# Patient Record
Sex: FEMALE | Race: BLACK OR AFRICAN AMERICAN | NOT HISPANIC OR LATINO | URBAN - METROPOLITAN AREA
[De-identification: names, ages, dates, MRNs, and addresses within clinical notes are randomized per-mention and may not be internally consistent; named-entity substitution may affect disease eponyms.]

---

## 2018-04-23 ENCOUNTER — APPOINTMENT (RX ONLY)
Dept: URBAN - METROPOLITAN AREA OTHER 4 | Facility: OTHER | Age: 53
Setting detail: DERMATOLOGY
End: 2018-04-23

## 2018-04-23 DIAGNOSIS — L56.8 OTHER SPECIFIED ACUTE SKIN CHANGES DUE TO ULTRAVIOLET RADIATION: ICD-10-CM

## 2018-04-23 DIAGNOSIS — L20.89 OTHER ATOPIC DERMATITIS: ICD-10-CM

## 2018-04-23 PROBLEM — E78.5 HYPERLIPIDEMIA, UNSPECIFIED: Status: ACTIVE | Noted: 2018-04-23

## 2018-04-23 PROBLEM — L20.84 INTRINSIC (ALLERGIC) ECZEMA: Status: ACTIVE | Noted: 2018-04-23

## 2018-04-23 PROCEDURE — ? PRESCRIPTION

## 2018-04-23 PROCEDURE — 99203 OFFICE O/P NEW LOW 30 MIN: CPT

## 2018-04-23 PROCEDURE — ? COUNSELING

## 2018-04-23 PROCEDURE — ? TREATMENT REGIMEN

## 2018-04-23 RX ORDER — HYDROCORTISONE 25 MG/ML
LOTION TOPICAL
Qty: 1 | Refills: 1 | Status: ERX | COMMUNITY
Start: 2018-04-23

## 2018-04-23 RX ORDER — HYDROQUINONE 4 %
CREAM (GRAM) TOPICAL
Qty: 1 | Refills: 1 | Status: ERX | COMMUNITY
Start: 2018-04-23

## 2018-04-23 RX ADMIN — HYDROCORTISONE: 25 LOTION TOPICAL at 00:00

## 2018-04-23 RX ADMIN — Medication: at 00:00

## 2018-04-23 ASSESSMENT — LOCATION DETAILED DESCRIPTION DERM
LOCATION DETAILED: LEFT AXILLARY VAULT
LOCATION DETAILED: RIGHT CENTRAL MALAR CHEEK
LOCATION DETAILED: RIGHT AXILLARY VAULT

## 2018-04-23 ASSESSMENT — LOCATION SIMPLE DESCRIPTION DERM
LOCATION SIMPLE: RIGHT CHEEK
LOCATION SIMPLE: LEFT AXILLARY VAULT
LOCATION SIMPLE: RIGHT AXILLARY VAULT

## 2018-04-23 ASSESSMENT — SEVERITY ASSESSMENT
SEVERITY: MODERATE
SEVERITY: MILD TO MODERATE

## 2018-04-23 ASSESSMENT — PAIN INTENSITY VAS: HOW INTENSE IS YOUR PAIN 0 BEING NO PAIN, 10 BEING THE MOST SEVERE PAIN POSSIBLE?: NO PAIN

## 2018-04-23 ASSESSMENT — LOCATION ZONE DERM
LOCATION ZONE: FACE
LOCATION ZONE: AXILLAE

## 2019-03-28 ENCOUNTER — RX ONLY (OUTPATIENT)
Age: 54
Setting detail: RX ONLY
End: 2019-03-28

## 2019-03-28 RX ORDER — HYDROCORTISONE 25 MG/ML
LOTION TOPICAL
Qty: 1 | Refills: 0 | Status: ERX

## 2019-06-03 ENCOUNTER — APPOINTMENT (RX ONLY)
Dept: URBAN - METROPOLITAN AREA OTHER 4 | Facility: OTHER | Age: 54
Setting detail: DERMATOLOGY
End: 2019-06-03

## 2019-06-03 DIAGNOSIS — L259 CONTACT DERMATITIS AND OTHER ECZEMA, UNSPECIFIED CAUSE: ICD-10-CM

## 2019-06-03 PROBLEM — L20.84 INTRINSIC (ALLERGIC) ECZEMA: Status: ACTIVE | Noted: 2019-06-03

## 2019-06-03 PROBLEM — L23.9 ALLERGIC CONTACT DERMATITIS, UNSPECIFIED CAUSE: Status: ACTIVE | Noted: 2019-06-03

## 2019-06-03 PROCEDURE — ? PRESCRIPTION

## 2019-06-03 PROCEDURE — 99213 OFFICE O/P EST LOW 20 MIN: CPT

## 2019-06-03 PROCEDURE — ? COUNSELING

## 2019-06-03 RX ORDER — TRIAMCINOLONE ACETONIDE 1 MG/G
OINTMENT TOPICAL
Qty: 1 | Refills: 0 | Status: ERX | COMMUNITY
Start: 2019-06-03

## 2019-06-03 RX ORDER — METHYLPREDNISOLONE 4 MG/1
TABLET ORAL
Qty: 1 | Refills: 0 | Status: ERX | COMMUNITY
Start: 2019-06-03

## 2019-06-03 RX ORDER — CLOBETASOL PROPIONATE 0.5 MG/G
OINTMENT TOPICAL
Qty: 1 | Refills: 0 | Status: ERX | COMMUNITY
Start: 2019-06-03

## 2019-06-03 RX ADMIN — CLOBETASOL PROPIONATE: 0.5 OINTMENT TOPICAL at 17:51

## 2019-06-03 RX ADMIN — TRIAMCINOLONE ACETONIDE: 1 OINTMENT TOPICAL at 17:52

## 2019-06-03 RX ADMIN — METHYLPREDNISOLONE: 4 TABLET ORAL at 17:51

## 2019-06-03 ASSESSMENT — LOCATION SIMPLE DESCRIPTION DERM
LOCATION SIMPLE: RIGHT FOREARM
LOCATION SIMPLE: LEFT FOREARM

## 2019-06-03 ASSESSMENT — SEVERITY ASSESSMENT: SEVERITY: MODERATE

## 2019-06-03 ASSESSMENT — LOCATION ZONE DERM: LOCATION ZONE: ARM

## 2019-06-03 ASSESSMENT — LOCATION DETAILED DESCRIPTION DERM
LOCATION DETAILED: RIGHT PROXIMAL DORSAL FOREARM
LOCATION DETAILED: LEFT DISTAL DORSAL FOREARM

## 2019-06-03 ASSESSMENT — PAIN INTENSITY VAS: HOW INTENSE IS YOUR PAIN 0 BEING NO PAIN, 10 BEING THE MOST SEVERE PAIN POSSIBLE?: NO PAIN

## 2019-06-18 ENCOUNTER — APPOINTMENT (RX ONLY)
Dept: URBAN - METROPOLITAN AREA OTHER 4 | Facility: OTHER | Age: 54
Setting detail: DERMATOLOGY
End: 2019-06-18

## 2019-06-18 DIAGNOSIS — L259 CONTACT DERMATITIS AND OTHER ECZEMA, UNSPECIFIED CAUSE: ICD-10-CM | Status: IMPROVED

## 2019-06-18 PROBLEM — L23.9 ALLERGIC CONTACT DERMATITIS, UNSPECIFIED CAUSE: Status: ACTIVE | Noted: 2019-06-18

## 2019-06-18 PROCEDURE — 99213 OFFICE O/P EST LOW 20 MIN: CPT

## 2019-06-18 PROCEDURE — ? TREATMENT REGIMEN

## 2019-06-18 PROCEDURE — ? COUNSELING

## 2019-06-18 ASSESSMENT — LOCATION SIMPLE DESCRIPTION DERM
LOCATION SIMPLE: RIGHT FOREARM
LOCATION SIMPLE: LEFT FOREARM

## 2019-06-18 ASSESSMENT — LOCATION DETAILED DESCRIPTION DERM
LOCATION DETAILED: LEFT PROXIMAL DORSAL FOREARM
LOCATION DETAILED: RIGHT PROXIMAL DORSAL FOREARM

## 2019-06-18 ASSESSMENT — PAIN INTENSITY VAS: HOW INTENSE IS YOUR PAIN 0 BEING NO PAIN, 10 BEING THE MOST SEVERE PAIN POSSIBLE?: NO PAIN

## 2019-06-18 ASSESSMENT — SEVERITY ASSESSMENT: SEVERITY: MILD

## 2019-06-18 ASSESSMENT — LOCATION ZONE DERM: LOCATION ZONE: ARM

## 2019-06-18 NOTE — PROCEDURE: TREATMENT REGIMEN
Continue Regimen: Triamcinolone ointment prn rash
Plan: Discussed using pysical block when out in the sun.
Detail Level: Simple
Samples Given: Gave samples of sunscreen with Zinc oxide to try

## 2019-10-04 ENCOUNTER — RX ONLY (OUTPATIENT)
Age: 54
Setting detail: RX ONLY
End: 2019-10-04

## 2019-10-04 RX ORDER — TRIAMCINOLONE ACETONIDE 1 MG/G
OINTMENT TOPICAL
Qty: 1 | Refills: 0 | Status: ERX

## 2020-05-05 PROBLEM — 14760008 CONSTIPATION: Status: ACTIVE | Noted: 2020-02-04

## 2020-05-05 PROBLEM — 305058001: Status: ACTIVE | Noted: 2020-05-05

## 2020-07-02 ENCOUNTER — TELEPHONE ENCOUNTER (OUTPATIENT)
Dept: URBAN - METROPOLITAN AREA CLINIC 35 | Facility: CLINIC | Age: 55
End: 2020-07-02

## 2020-07-02 RX ORDER — DICLOFENAC SODIUM TOPICAL GEL, 1%, 10 MG/G
APPLY 4 GRAMS TO AFFECTED AREA 4 TIMES A DAY GEL TOPICAL
Qty: 500 UNSPECIFIED | Refills: 4 | COMMUNITY

## 2020-07-02 RX ORDER — ZOLPIDEM TARTRATE 10 MG/1
1 TABLET AT BEDTIME AS NEEDED TABLET, FILM COATED ORAL ONCE A DAY
COMMUNITY

## 2020-07-02 RX ORDER — LORATADINE 10 MG/1
1 TABLET TABLET ORAL ONCE A DAY
Qty: 30 | COMMUNITY

## 2020-07-02 RX ORDER — ATORVASTATIN CALCIUM, FILM COATED 20 MG/1
TAKE 1 TABLET BY MOUTH EVERYDAY AT BEDTIME TABLET ORAL
Qty: 90 UNSPECIFIED | Refills: 1 | COMMUNITY

## 2020-07-02 RX ORDER — DIPHENHYDRAMINE HCL 2 %
1 TABLET AS NEEDED CREAM (GRAM) TOPICAL
COMMUNITY

## 2020-07-02 RX ORDER — URSODIOL 300 MG/1
AS DIRECTED 2 AM AND 1 PM CAPSULE ORAL TWICE A DAY
COMMUNITY

## 2020-07-02 RX ORDER — BECLOMETHASONE DIPROPIONATE HFA 40 UG/1
1 PUFF AEROSOL, METERED RESPIRATORY (INHALATION) ONCE A DAY
COMMUNITY

## 2020-07-02 RX ORDER — ESTRADIOL 10 UG/1
AS DIRECTED INSERT VAGINAL
COMMUNITY
Start: 2020-02-04

## 2020-07-02 RX ORDER — ALBUTEROL SULFATE 90 UG/1
1 PUFF AS NEEDED AEROSOL, METERED RESPIRATORY (INHALATION)
COMMUNITY

## 2020-07-27 ENCOUNTER — LAB OUTSIDE AN ENCOUNTER (OUTPATIENT)
Dept: URBAN - METROPOLITAN AREA CLINIC 35 | Facility: CLINIC | Age: 55
End: 2020-07-27

## 2020-07-30 ENCOUNTER — TELEPHONE ENCOUNTER (OUTPATIENT)
Dept: URBAN - METROPOLITAN AREA CLINIC 35 | Facility: CLINIC | Age: 55
End: 2020-07-30

## 2020-07-30 RX ORDER — URSODIOL 300 MG/1
AS DIRECTED 2 AM AND 1 PM CAPSULE ORAL TWICE A DAY
Qty: 270 | Refills: 2 | OUTPATIENT

## 2020-07-31 LAB
A/G RATIO: 1.3
ALBUMIN, SERUM: 4.5
ALKALINE PHOSPHATASE, S: 245
ALT (SGPT): 29
AST (SGOT): 34
BILIRUBIN, TOTAL: <0.2
BUN/CREATININE RATIO: 17
BUN: 13
CALCIUM, SERUM: 9.8
CARBON DIOXIDE, TOTAL: 24
CHLORIDE, SERUM: 104
CREATININE, SERUM: 0.75
EGFR IF AFRICN AM: 105
EGFR IF NONAFRICN AM: 91
GLOBULIN, TOTAL: 3.6
GLUCOSE, SERUM: (no result)
POTASSIUM, SERUM: (no result)
PROTEIN, TOTAL, SERUM: 8.1
SODIUM, SERUM: 144

## 2020-08-04 ENCOUNTER — OFFICE VISIT (OUTPATIENT)
Dept: URBAN - METROPOLITAN AREA CLINIC 35 | Facility: CLINIC | Age: 55
End: 2020-08-04

## 2020-08-04 VITALS — BODY MASS INDEX: 21.17 KG/M2 | HEIGHT: 64 IN | WEIGHT: 124 LBS

## 2020-08-04 RX ORDER — URSODIOL 300 MG/1
AS DIRECTED 2 AM AND 1 PM CAPSULE ORAL TWICE A DAY
Qty: 270 | Refills: 2 | Status: ACTIVE | COMMUNITY

## 2020-08-04 RX ORDER — LORATADINE 10 MG/1
1 TABLET TABLET ORAL ONCE A DAY
Qty: 30 | COMMUNITY

## 2020-08-04 RX ORDER — DICLOFENAC SODIUM TOPICAL GEL, 1%, 10 MG/G
APPLY 4 GRAMS TO AFFECTED AREA 4 TIMES A DAY GEL TOPICAL
Qty: 500 UNSPECIFIED | Refills: 4 | Status: ACTIVE | COMMUNITY

## 2020-08-04 RX ORDER — ATORVASTATIN CALCIUM, FILM COATED 20 MG/1
TAKE 1 TABLET BY MOUTH EVERYDAY AT BEDTIME TABLET ORAL
Qty: 90 UNSPECIFIED | Refills: 1 | Status: ACTIVE | COMMUNITY

## 2020-08-04 RX ORDER — LORATADINE 10 MG/1
1 TABLET TABLET ORAL ONCE A DAY
Qty: 30 | Status: ON HOLD | COMMUNITY

## 2020-08-04 RX ORDER — BECLOMETHASONE DIPROPIONATE HFA 40 UG/1
1 PUFF AEROSOL, METERED RESPIRATORY (INHALATION) ONCE A DAY
Status: ACTIVE | COMMUNITY

## 2020-08-04 RX ORDER — ZOLPIDEM TARTRATE 10 MG/1
1 TABLET AT BEDTIME AS NEEDED TABLET, FILM COATED ORAL ONCE A DAY
Status: ACTIVE | COMMUNITY

## 2020-08-04 RX ORDER — SALICYLIC ACID 3 G/100G
1 TABLET AS NEEDED LOTION TOPICAL ONCE A DAY
Qty: 30 | Status: ACTIVE | COMMUNITY

## 2020-08-04 RX ORDER — URSODIOL 300 MG/1
AS DIRECTED 2 AM AND 1 PM CAPSULE ORAL TWICE A DAY
OUTPATIENT

## 2020-08-04 RX ORDER — LORATADINE 10 MG
1 PACKET MIXED WITH 8 OUNCES OF FLUID TABLET,DISINTEGRATING ORAL ONCE A DAY
Qty: 30 | OUTPATIENT
Start: 2020-08-04

## 2020-08-04 RX ORDER — DIPHENHYDRAMINE HCL 2 %
1 TABLET AS NEEDED CREAM (GRAM) TOPICAL
Status: ACTIVE | COMMUNITY

## 2020-08-04 RX ORDER — ALBUTEROL SULFATE 90 UG/1
1 PUFF AS NEEDED AEROSOL, METERED RESPIRATORY (INHALATION)
COMMUNITY

## 2020-08-04 NOTE — HPI-MIGRATED HPI
;   ;   ;     Primary Biliary Cirrhosis (PBC) : Patient presents today to review labs and follow up of PBC.  She continue to take Ursodiol 300 MG, 2 am and 1 pm daily  She admit always feeling fatigue.  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  She will take Ambien prn with improvement.   Denies itchiness, abdominal pain, jaundice,  nausea, or vomiting.  She continue to take admit the continued use Ursodiol Capsule, 300 MG, 2 am and 1 pm.      Last visit (2/4/2020) Patient presents today to review labs and follow up of Primary Biliary Cirrhosis. She  continue to take Ursodiol 300 mg 2 QAM and 1 QPM.   She admit increase fatigue, described as feeling sleepy and more tired then   Denies itchiness, abdominal pain, jaundice,  nausea, or vomiting.   Had been on antibiotics in December 2109 for 7 days for UTI   07/16/2019 Patient presents today for a follow up of Primary Biliary Cirrhosis and to review recent labs.  Patient was diagnosed with PBC in 2010.   She began treatment with Ocaliva (4/2019 -39 days ago)  and continue to take Ursodiol 300 mg 2 QAM and 1 QPM.   She admit increase fatigue, described as feeling sleepy and more tired then usual since last visit.  She has been trying to stay busy to avoid disrupting her sleep pattern.  Denies itchiness, abdominal pain, jaundice,  nausea, or vomiting.   Last visit  (2/26/2019) Patient presents today for a follow up of Primary Biliary Cirrhosis and to review recent labs.  Patient was diagnosed with PBC in 2010.   She continue to take Ursodiol 300 mg BID.    She currently admit 1 bowel movement per day with normal and formed stools.  Denies melena, blood or mucus in stools.   She denies jaundice, RUQ abdominal pain, nausea, vomiting, or fatigue.    Last visit (11/27/2018) Patient presents today for a follow up of Primary Biliary Cirrhosis and to review recent labs. Patient was diagnosed with PBC in 2010. She admits to continuing Ursodiol since last visit. She denies nausea, vomiting, or fatigue at this time. Patient admits to intermittent epigastric pain.  05/29/2018 Patient presents today for a follow up of Primary Biliary Cirrhosis and to review recent lab results. She has a history of PBC diagnosed in 2010 via labs and a liver biopsy.   She continues to experience fatigue daily. She denies any recent NSAID's, RUQ pain, jaundice, dizziness or chills. Continues to easy bruising.  Patient did submit a stool specimen to the lab, however they had given her the wrong specimen container and the test could not be competed.  11/28/2017 Patient presents today to review labs and for a follow up of Primary Biliary Cirrhosis. She was diagnosed with PBC in 2010 following routine labs and a liver biopsy to confirm the diagnosis. She notes improvement to fatigue since the last visit but states her energy levels have been low since her total hysterectomy in October 2017. Patient notes she has continued to abstain from NSAID's since last visit.   Patient currently denies RUQ pain, jaundice, or dizziness. Denies chills. Admits easy bruising for the past few months. ;   Bowel Obstruction : Currently 1 incomplete and strenuous bowel movement ever other day over the month. Denies melena, blood or mucus in stools.  She has been increasing her water intake.    Last visit (2/4/2020) Patient admits that she will have 1 bowel movement every other day. She admits strain with every movement she denies any rectal bleeding at this time. She denies any mucus at this time.     Last visit (2/4/2020) Currently admit 2 strenuous bowel movements per day, then will skip 2 days.  Stools vary from soft semi-formed to loose.  Denies melena, blood or mucus in stools.   She has not taken medication for constipation.   Last visit (07/16/2019) Currently admit 1 bowel movement per day.  Stools are soft and formed.  Denies melena, blood or mucus in stools.   Last visit (2/26/2019 Patient admits she began experiencing severe abdominal pain in August 2018. She admits having a CT scan that revealed a small bowel obstruction.   Patient admit having a small bowel resection completed in August 2018 by Dr Aime Villegas at [facility].   Last visit (11/27/2018) Patient admits she began experiencing severe abdominal pain in August. She admits having a CT scan that revealed a small bowel obstruction. Patient admits having a small bowel resection completed in August by Dr Aime Villegas at [facility]. ;   Epigastric Pain : Patient continue to admit episodes of epigastric pain since her last office visit that lasts seconds to minutes. She describes symptoms as a dull pain.   Last visit (2/4/2020) Patient admits epigastric pain since her last office visit that lasts seconds to minutes. She describes symptoms as a dull pain.   She denies any bloating and gas at this time.    Last visit (2/4/2020) She continue to deny epigastric pain.   07/16/2019 She continue to deny epigastric pain.   2/26/2019 Denies epigastric pain.  Last visit (11/27/2018) Patient complains of intermittent epigastric pain . She admits she had been experiencing this pain since before August. Patient admits pain subsides after she eats. Patient denies taking any OTC antacids at this time. She denies nausea or vomiting.;

## 2020-08-11 ENCOUNTER — TELEPHONE ENCOUNTER (OUTPATIENT)
Dept: URBAN - METROPOLITAN AREA CLINIC 35 | Facility: CLINIC | Age: 55
End: 2020-08-11

## 2020-08-14 LAB — H. PYLORI STOOL AG, EIA: NEGATIVE

## 2020-09-01 ENCOUNTER — OFFICE VISIT (OUTPATIENT)
Dept: URBAN - METROPOLITAN AREA CLINIC 35 | Facility: CLINIC | Age: 55
End: 2020-09-01

## 2020-09-01 VITALS
BODY MASS INDEX: 22.2 KG/M2 | SYSTOLIC BLOOD PRESSURE: 122 MMHG | WEIGHT: 130 LBS | HEIGHT: 64 IN | DIASTOLIC BLOOD PRESSURE: 60 MMHG

## 2020-09-01 RX ORDER — SALICYLIC ACID 3 G/100G
1 TABLET AS NEEDED LOTION TOPICAL ONCE A DAY
Qty: 30 | Status: ACTIVE | COMMUNITY

## 2020-09-01 RX ORDER — URSODIOL 300 MG/1
AS DIRECTED 2 AM AND 1 PM CAPSULE ORAL TWICE A DAY
Status: ACTIVE | COMMUNITY

## 2020-09-01 RX ORDER — LORATADINE 10 MG
1 PACKET MIXED WITH 8 OUNCES OF FLUID TABLET,DISINTEGRATING ORAL ONCE A DAY
Qty: 30 | Status: DISCONTINUED | COMMUNITY
Start: 2020-08-04

## 2020-09-01 RX ORDER — URSODIOL 300 MG/1
AS DIRECTED 2 AM AND 1 PM CAPSULE ORAL TWICE A DAY
Qty: 270 | OUTPATIENT

## 2020-09-01 RX ORDER — ALBUTEROL SULFATE 90 UG/1
1 PUFF AS NEEDED AEROSOL, METERED RESPIRATORY (INHALATION)
COMMUNITY

## 2020-09-01 RX ORDER — ZOLPIDEM TARTRATE 10 MG/1
1 TABLET AT BEDTIME AS NEEDED TABLET, FILM COATED ORAL ONCE A DAY
Status: ACTIVE | COMMUNITY

## 2020-09-01 RX ORDER — DIPHENHYDRAMINE HCL 2 %
1 TABLET AS NEEDED CREAM (GRAM) TOPICAL
Status: ACTIVE | COMMUNITY

## 2020-09-01 RX ORDER — ATORVASTATIN CALCIUM, FILM COATED 20 MG/1
TAKE 1 TABLET BY MOUTH EVERYDAY AT BEDTIME TABLET ORAL
Qty: 90 UNSPECIFIED | Refills: 1 | Status: ACTIVE | COMMUNITY

## 2020-09-01 RX ORDER — SODIUM, POTASSIUM,MAG SULFATES 17.5-3.13G
177 ML SOLUTION, RECONSTITUTED, ORAL ORAL DAILY
Qty: 354 ML | Refills: 0 | OUTPATIENT
Start: 2020-09-01

## 2020-09-01 RX ORDER — LORATADINE 10 MG/1
1 TABLET TABLET ORAL ONCE A DAY
Qty: 30 | COMMUNITY

## 2020-09-01 RX ORDER — DICLOFENAC SODIUM TOPICAL GEL, 1%, 10 MG/G
APPLY 4 GRAMS TO AFFECTED AREA 4 TIMES A DAY GEL TOPICAL
Qty: 500 UNSPECIFIED | Refills: 4 | Status: ACTIVE | COMMUNITY

## 2020-09-01 RX ORDER — LORATADINE 10 MG/1
1 TABLET TABLET ORAL ONCE A DAY
Qty: 30 | Status: ON HOLD | COMMUNITY

## 2020-09-01 RX ORDER — BECLOMETHASONE DIPROPIONATE HFA 40 UG/1
1 PUFF AEROSOL, METERED RESPIRATORY (INHALATION) ONCE A DAY
Status: ACTIVE | COMMUNITY

## 2020-09-01 NOTE — HPI-MIGRATED HPI
;   ;   ;     Primary Biliary Cirrhosis (PBC) : Patient presents today to review labs, Liver Biopsy and follow up of Primary Biliary Cirrhosis. She continue to take Ursodiol 300 mg, 2 QAM and 1 QPM. Patient had a U/S guided liver biopsy completed on 8/21/2020 with results listed below.   She continues to admits constant feeling of fatigue.  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  She will take Ambien prn with improvement.  Denies itchiness, abdominal pain, jaundice,  nausea, or vomiting.   Last visit (8/4/2020) Patient presents today to review labs and follow up of PBC.  She continue to take Ursodiol 300 MG, 2 am and 1 pm daily  She admit always feeling fatigue.  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  She will take Ambien prn with improvement.   Denies itchiness, abdominal pain, jaundice,  nausea, or vomiting.  She continue to take admit the continued use Ursodiol Capsule, 300 MG, 2 am and 1 pm.      Last visit (2/4/2020) Patient presents today to review labs and follow up of Primary Biliary Cirrhosis. She  continue to take Ursodiol 300 mg 2 QAM and 1 QPM.   She admit increase fatigue, described as feeling sleepy and more tired then   Denies itchiness, abdominal pain, jaundice,  nausea, or vomiting.   Had been on antibiotics in December 2109 for 7 days for UTI   07/16/2019 Patient presents today for a follow up of Primary Biliary Cirrhosis and to review recent labs.  Patient was diagnosed with PBC in 2010.   She began treatment with Ocaliva (4/2019 -39 days ago)  and continue to take Ursodiol 300 mg 2 QAM and 1 QPM.   She admit increase fatigue, described as feeling sleepy and more tired then usual since last visit.  She has been trying to stay busy to avoid disrupting her sleep pattern.  Denies itchiness, abdominal pain, jaundice,  nausea, or vomiting.   Last visit  (2/26/2019) Patient presents today for a follow up of Primary Biliary Cirrhosis and to review recent labs.  Patient was diagnosed with PBC in 2010.   She continue to take Ursodiol 300 mg BID.    She currently admit 1 bowel movement per day with normal and formed stools.  Denies melena, blood or mucus in stools.   She denies jaundice, RUQ abdominal pain, nausea, vomiting, or fatigue.    Last visit (11/27/2018) Patient presents today for a follow up of Primary Biliary Cirrhosis and to review recent labs. Patient was diagnosed with PBC in 2010. She admits to continuing Ursodiol since last visit. She denies nausea, vomiting, or fatigue at this time. Patient admits to intermittent epigastric pain.  05/29/2018 Patient presents today for a follow up of Primary Biliary Cirrhosis and to review recent lab results. She has a history of PBC diagnosed in 2010 via labs and a liver biopsy.   She continues to experience fatigue daily. She denies any recent NSAID's, RUQ pain, jaundice, dizziness or chills. Continues to easy bruising.  Patient did submit a stool specimen to the lab, however they had given her the wrong specimen container and the test could not be competed.  11/28/2017 Patient presents today to review labs and for a follow up of Primary Biliary Cirrhosis. She was diagnosed with PBC in 2010 following routine labs and a liver biopsy to confirm the diagnosis. She notes improvement to fatigue since the last visit but states her energy levels have been low since her total hysterectomy in October 2017. Patient notes she has continued to abstain from NSAID's since last visit.   Patient currently denies RUQ pain, jaundice, or dizziness. Denies chills. Admits easy bruising for the past few months. ;   Bowel Obstruction : Patient currently admit 1 bowel movement every other day with occasional straining. Stools are semi-formed.  Denies melena, blood or mucus.   She not take MiraLax Packet, 17 GM, 1 packet mixed with 8 ounces of fluid, Orally, Once a day, 30 day(s), 30    Last visit (8/4/2020) Currently 1 incomplete and strenuous bowel movement ever other day over the month. Denies melena, blood or mucus in stools.  She has been increasing her water intake.    Last visit (2/4/2020) Patient admits that she will have 1 bowel movement every other day. She admits strain with every movement she denies any rectal bleeding at this time. She denies any mucus at this time.     Last visit (2/4/2020) Currently admit 2 strenuous bowel movements per day, then will skip 2 days.  Stools vary from soft semi-formed to loose.  Denies melena, blood or mucus in stools.   She has not taken medication for constipation.   Last visit (07/16/2019) Currently admit 1 bowel movement per day.  Stools are soft and formed.  Denies melena, blood or mucus in stools.   Last visit (2/26/2019 Patient admits she began experiencing severe abdominal pain in August 2018. She admits having a CT scan that revealed a small bowel obstruction.   Patient admit having a small bowel resection completed in August 2018 by Dr Aime Villegas at [facility].   Last visit (11/27/2018) Patient admits she began experiencing severe abdominal pain in August. She admits having a CT scan that revealed a small bowel obstruction. Patient admits having a small bowel resection completed in August by Dr Aime Villegas at [facility]. ;   Epigastric Pain : Patient continue to admit episodes of epigastric pain that occur without provocation.  Symptoms will last seconds to minutes at a time then dissipate on it's own.. She describes symptoms as a dull pain.     Last visit (8/4/2020) Patient continue to admit episodes of epigastric pain since her last office visit that lasts seconds to minutes. She describes symptoms as a dull pain.   Last visit (2/4/2020) Patient admits epigastric pain since her last office visit that lasts seconds to minutes. She describes symptoms as a dull pain.   She denies any bloating and gas at this time.    Last visit (2/4/2020) She continue to deny epigastric pain.   07/16/2019 She continue to deny epigastric pain.   2/26/2019 Denies epigastric pain.  Last visit (11/27/2018) Patient complains of intermittent epigastric pain . She admits she had been experiencing this pain since before August. Patient admits pain subsides after she eats. Patient denies taking any OTC antacids at this time. She denies nausea or vomiting.;

## 2020-09-09 ENCOUNTER — TELEPHONE ENCOUNTER (OUTPATIENT)
Dept: URBAN - METROPOLITAN AREA CLINIC 35 | Facility: CLINIC | Age: 55
End: 2020-09-09

## 2020-09-16 ENCOUNTER — TELEPHONE ENCOUNTER (OUTPATIENT)
Dept: URBAN - METROPOLITAN AREA CLINIC 35 | Facility: CLINIC | Age: 55
End: 2020-09-16

## 2020-11-16 ENCOUNTER — OFFICE VISIT (OUTPATIENT)
Dept: URBAN - METROPOLITAN AREA CLINIC 35 | Facility: CLINIC | Age: 55
End: 2020-11-16

## 2020-11-20 ENCOUNTER — OFFICE VISIT (OUTPATIENT)
Dept: URBAN - METROPOLITAN AREA SURGERY CENTER 8 | Facility: SURGERY CENTER | Age: 55
End: 2020-11-20

## 2020-12-08 ENCOUNTER — TELEPHONE ENCOUNTER (OUTPATIENT)
Dept: URBAN - METROPOLITAN AREA CLINIC 35 | Facility: CLINIC | Age: 55
End: 2020-12-08

## 2020-12-08 ENCOUNTER — OFFICE VISIT (OUTPATIENT)
Dept: URBAN - METROPOLITAN AREA CLINIC 35 | Facility: CLINIC | Age: 55
End: 2020-12-08

## 2020-12-08 RX ORDER — SODIUM, POTASSIUM,MAG SULFATES 17.5-3.13G
177 ML SOLUTION, RECONSTITUTED, ORAL ORAL DAILY
Qty: 354 ML | Refills: 0 | Status: ACTIVE | COMMUNITY
Start: 2020-09-01

## 2020-12-08 RX ORDER — DICLOFENAC SODIUM TOPICAL GEL, 1%, 10 MG/G
APPLY 4 GRAMS TO AFFECTED AREA 4 TIMES A DAY GEL TOPICAL
Qty: 500 UNSPECIFIED | Refills: 4 | Status: ACTIVE | COMMUNITY

## 2020-12-08 RX ORDER — DIPHENHYDRAMINE HCL 2 %
1 TABLET AS NEEDED CREAM (GRAM) TOPICAL
Status: ACTIVE | COMMUNITY

## 2020-12-08 RX ORDER — LORATADINE 10 MG/1
1 TABLET TABLET ORAL ONCE A DAY
Qty: 30 | COMMUNITY

## 2020-12-08 RX ORDER — URSODIOL 300 MG/1
AS DIRECTED 2 AM AND 1 PM CAPSULE ORAL TWICE A DAY
Qty: 270 | Status: ACTIVE | COMMUNITY

## 2020-12-08 RX ORDER — SALICYLIC ACID 3 G/100G
1 TABLET AS NEEDED LOTION TOPICAL ONCE A DAY
Qty: 30 | Status: ACTIVE | COMMUNITY

## 2020-12-08 RX ORDER — BECLOMETHASONE DIPROPIONATE HFA 40 UG/1
1 PUFF AEROSOL, METERED RESPIRATORY (INHALATION) ONCE A DAY
Status: ACTIVE | COMMUNITY

## 2020-12-08 RX ORDER — ALBUTEROL SULFATE 90 UG/1
1 PUFF AS NEEDED AEROSOL, METERED RESPIRATORY (INHALATION)
COMMUNITY

## 2020-12-08 RX ORDER — LORATADINE 10 MG/1
1 TABLET TABLET ORAL ONCE A DAY
Qty: 30 | Status: ON HOLD | COMMUNITY

## 2020-12-08 RX ORDER — ZOLPIDEM TARTRATE 10 MG/1
1 TABLET AT BEDTIME AS NEEDED TABLET, FILM COATED ORAL ONCE A DAY
Status: ACTIVE | COMMUNITY

## 2020-12-08 RX ORDER — ATORVASTATIN CALCIUM, FILM COATED 20 MG/1
TAKE 1 TABLET BY MOUTH EVERYDAY AT BEDTIME TABLET ORAL
Qty: 90 UNSPECIFIED | Refills: 1 | Status: ACTIVE | COMMUNITY

## 2020-12-08 NOTE — HPI-MIGRATED HPI
;   ;   ;     Primary Biliary Cirrhosis (PBC) : 56 Y/O Female presents today for follow up for her Colonoscopy.   ? Continue Ursodiol Capsule, 300 MG, as directed 2 am and 1 pm, Orally, Twice a day, 90 days, 270, Notes: 2 tabs am 1 tab po evening  Notes: Consider Fibrates with Ursodiol.      Last visit (9/01/20) Patient presents today to review labs, Liver Biopsy and follow up of Primary Biliary Cirrhosis. She continue to take Ursodiol 300 mg, 2 QAM and 1 QPM. Patient had a U/S guided liver biopsy completed on 8/21/2020 with results listed below.   She continues to admits constant feeling of fatigue.  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  She will take Ambien prn with improvement.  Denies itchiness, abdominal pain, jaundice,  nausea, or vomiting.   Last visit (8/4/2020) Patient presents today to review labs and follow up of PBC.  She continue to take Ursodiol 300 MG, 2 am and 1 pm daily  She admit always feeling fatigue.  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  She will take Ambien prn with improvement.   Denies itchiness, abdominal pain, jaundice,  nausea, or vomiting.  She continue to take admit the continued use Ursodiol Capsule, 300 MG, 2 am and 1 pm.      Last visit (2/4/2020) Patient presents today to review labs and follow up of Primary Biliary Cirrhosis. She  continue to take Ursodiol 300 mg 2 QAM and 1 QPM.   She admit increase fatigue, described as feeling sleepy and more tired then   Denies itchiness, abdominal pain, jaundice,  nausea, or vomiting.   Had been on antibiotics in December 2109 for 7 days for UTI   07/16/2019 Patient presents today for a follow up of Primary Biliary Cirrhosis and to review recent labs.  Patient was diagnosed with PBC in 2010.   She began treatment with Ocaliva (4/2019 -39 days ago)  and continue to take Ursodiol 300 mg 2 QAM and 1 QPM.   She admit increase fatigue, described as feeling sleepy and more tired then usual since last visit.  She has been trying to stay busy to avoid disrupting her sleep pattern.  Denies itchiness, abdominal pain, jaundice,  nausea, or vomiting.   Last visit  (2/26/2019) Patient presents today for a follow up of Primary Biliary Cirrhosis and to review recent labs.  Patient was diagnosed with PBC in 2010.   She continue to take Ursodiol 300 mg BID.    She currently admit 1 bowel movement per day with normal and formed stools.  Denies melena, blood or mucus in stools.   She denies jaundice, RUQ abdominal pain, nausea, vomiting, or fatigue.    Last visit (11/27/2018) Patient presents today for a follow up of Primary Biliary Cirrhosis and to review recent labs. Patient was diagnosed with PBC in 2010. She admits to continuing Ursodiol since last visit. She denies nausea, vomiting, or fatigue at this time. Patient admits to intermittent epigastric pain.  05/29/2018 Patient presents today for a follow up of Primary Biliary Cirrhosis and to review recent lab results. She has a history of PBC diagnosed in 2010 via labs and a liver biopsy.   She continues to experience fatigue daily. She denies any recent NSAID's, RUQ pain, jaundice, dizziness or chills. Continues to easy bruising.  Patient did submit a stool specimen to the lab, however they had given her the wrong specimen container and the test could not be competed.  11/28/2017 Patient presents today to review labs and for a follow up of Primary Biliary Cirrhosis. She was diagnosed with PBC in 2010 following routine labs and a liver biopsy to confirm the diagnosis. She notes improvement to fatigue since the last visit but states her energy levels have been low since her total hysterectomy in October 2017. Patient notes she has continued to abstain from NSAID's since last visit.   Patient currently denies RUQ pain, jaundice, or dizziness. Denies chills. Admits easy bruising for the past few months. ;   Bowel Obstruction : Patient currently admits/ denies bowel movement every other day with occasional straining.   Last visit (9/01/20) Patient currently admit 1 bowel movement every other day with occasional straining. Stools are semi-formed.  Denies melena, blood or mucus.   She not take MiraLax Packet, 17 GM, 1 packet mixed with 8 ounces of fluid, Orally, Once a day, 30 day(s), 30    Last visit (8/4/2020) Currently 1 incomplete and strenuous bowel movement ever other day over the month. Denies melena, blood or mucus in stools.  She has been increasing her water intake.    Last visit (2/4/2020) Patient admits that she will have 1 bowel movement every other day. She admits strain with every movement she denies any rectal bleeding at this time. She denies any mucus at this time.     Last visit (2/4/2020) Currently admit 2 strenuous bowel movements per day, then will skip 2 days.  Stools vary from soft semi-formed to loose.  Denies melena, blood or mucus in stools.   She has not taken medication for constipation.   Last visit (07/16/2019) Currently admit 1 bowel movement per day.  Stools are soft and formed.  Denies melena, blood or mucus in stools.   Last visit (2/26/2019 Patient admits she began experiencing severe abdominal pain in August 2018. She admits having a CT scan that revealed a small bowel obstruction.   Patient admit having a small bowel resection completed in August 2018 by Dr Aime Villegas at [facility].   Last visit (11/27/2018) Patient admits she began experiencing severe abdominal pain in August. She admits having a CT scan that revealed a small bowel obstruction. Patient admits having a small bowel resection completed in August by Dr Aime Villegas at [facility]. ;   Epigastric Pain : Patient admits/ denies any episodes of epigastric pain.   Last visit (9/01/20) Patient continue to admit episodes of epigastric pain that occur without provocation.  Symptoms will last seconds to minutes at a time then dissipate on it's own.. She describes symptoms as a dull pain.     Last visit (8/4/2020) Patient continue to admit episodes of epigastric pain since her last office visit that lasts seconds to minutes. She describes symptoms as a dull pain.   Last visit (2/4/2020) Patient admits epigastric pain since her last office visit that lasts seconds to minutes. She describes symptoms as a dull pain.   She denies any bloating and gas at this time.    Last visit (2/4/2020) She continue to deny epigastric pain.   07/16/2019 She continue to deny epigastric pain.   2/26/2019 Denies epigastric pain.  Last visit (11/27/2018) Patient complains of intermittent epigastric pain . She admits she had been experiencing this pain since before August. Patient admits pain subsides after she eats. Patient denies taking any OTC antacids at this time. She denies nausea or vomiting.;

## 2020-12-09 ENCOUNTER — TELEPHONE ENCOUNTER (OUTPATIENT)
Dept: URBAN - METROPOLITAN AREA CLINIC 35 | Facility: CLINIC | Age: 55
End: 2020-12-09

## 2020-12-22 ENCOUNTER — OFFICE VISIT (OUTPATIENT)
Dept: URBAN - METROPOLITAN AREA CLINIC 35 | Facility: CLINIC | Age: 55
End: 2020-12-22

## 2020-12-30 ENCOUNTER — TELEPHONE ENCOUNTER (OUTPATIENT)
Dept: URBAN - METROPOLITAN AREA CLINIC 35 | Facility: CLINIC | Age: 55
End: 2020-12-30

## 2021-01-21 ENCOUNTER — LAB OUTSIDE AN ENCOUNTER (OUTPATIENT)
Dept: URBAN - METROPOLITAN AREA CLINIC 35 | Facility: CLINIC | Age: 56
End: 2021-01-21

## 2021-01-22 LAB
A/G RATIO: 1.4
ALBUMIN, SERUM: 4.5
ALKALINE PHOSPHATASE, S: 216
ALT (SGPT): 25
AST (SGOT): 37
BILIRUBIN, TOTAL: <0.2
BUN/CREATININE RATIO: 16
BUN: 16
CALCIUM, SERUM: 9.7
CARBON DIOXIDE, TOTAL: 26
CHLORIDE, SERUM: 102
CREATININE, SERUM: 1
EGFR IF AFRICN AM: 73
EGFR IF NONAFRICN AM: 64
GLOBULIN, TOTAL: 3.3
GLUCOSE, SERUM: 106
POTASSIUM, SERUM: 4.4
PROTEIN, TOTAL, SERUM: 7.8
SODIUM, SERUM: 142

## 2021-01-26 ENCOUNTER — OFFICE VISIT (OUTPATIENT)
Dept: URBAN - METROPOLITAN AREA CLINIC 35 | Facility: CLINIC | Age: 56
End: 2021-01-26

## 2021-01-26 VITALS
BODY MASS INDEX: 20.66 KG/M2 | DIASTOLIC BLOOD PRESSURE: 67 MMHG | SYSTOLIC BLOOD PRESSURE: 116 MMHG | TEMPERATURE: 98.9 F | HEIGHT: 64 IN | HEART RATE: 97 BPM | OXYGEN SATURATION: 99 % | WEIGHT: 121 LBS

## 2021-01-26 RX ORDER — HYOSCYAMINE SULFATE 0.12 MG/1
1 TABLET AS NEEDED TABLET ORAL
Qty: 60 | Refills: 0 | OUTPATIENT
Start: 2021-01-26

## 2021-01-26 RX ORDER — LORATADINE 10 MG/1
1 TABLET TABLET ORAL ONCE A DAY
Qty: 30 | Status: ON HOLD | COMMUNITY

## 2021-01-26 RX ORDER — BECLOMETHASONE DIPROPIONATE HFA 40 UG/1
1 PUFF AEROSOL, METERED RESPIRATORY (INHALATION) ONCE A DAY
Status: ACTIVE | COMMUNITY

## 2021-01-26 RX ORDER — ALBUTEROL SULFATE 90 UG/1
1 PUFF AS NEEDED AEROSOL, METERED RESPIRATORY (INHALATION)
Status: ACTIVE | COMMUNITY

## 2021-01-26 RX ORDER — URSODIOL 300 MG/1
AS DIRECTED 2 AM AND 1 PM CAPSULE ORAL TWICE A DAY
Qty: 270 | Status: ACTIVE | COMMUNITY

## 2021-01-26 RX ORDER — ATORVASTATIN CALCIUM, FILM COATED 20 MG/1
TAKE 1 TABLET BY MOUTH EVERYDAY AT BEDTIME TABLET ORAL
Qty: 90 UNSPECIFIED | Refills: 1 | Status: ACTIVE | COMMUNITY

## 2021-01-26 RX ORDER — DIPHENHYDRAMINE HCL 2 %
1 TABLET AS NEEDED CREAM (GRAM) TOPICAL
Status: ACTIVE | COMMUNITY

## 2021-01-26 RX ORDER — SALICYLIC ACID 3 G/100G
1 TABLET AS NEEDED LOTION TOPICAL ONCE A DAY
Qty: 30 | Status: ACTIVE | COMMUNITY

## 2021-01-26 RX ORDER — LORATADINE 10 MG/1
1 TABLET TABLET ORAL ONCE A DAY
Qty: 30 | Status: ACTIVE | COMMUNITY

## 2021-01-26 RX ORDER — URSODIOL 300 MG/1
AS DIRECTED 2 AM AND 1 PM CAPSULE ORAL TWICE A DAY
OUTPATIENT

## 2021-01-26 RX ORDER — DICLOFENAC SODIUM TOPICAL GEL, 1%, 10 MG/G
APPLY 4 GRAMS TO AFFECTED AREA 4 TIMES A DAY GEL TOPICAL
Qty: 500 UNSPECIFIED | Refills: 4 | Status: DISCONTINUED | COMMUNITY

## 2021-01-26 RX ORDER — ZOLPIDEM TARTRATE 10 MG/1
1 TABLET AT BEDTIME AS NEEDED TABLET, FILM COATED ORAL ONCE A DAY
Status: ACTIVE | COMMUNITY

## 2021-01-26 RX ORDER — SODIUM, POTASSIUM,MAG SULFATES 17.5-3.13G
177 ML SOLUTION, RECONSTITUTED, ORAL ORAL DAILY
Qty: 354 ML | Refills: 0 | Status: DISCONTINUED | COMMUNITY
Start: 2020-09-01

## 2021-01-26 NOTE — HPI-MIGRATED HPI
;   ;   ;     Primary Biliary Cirrhosis (PBC) : Patient presents today for a follow up of Primary Biliary Cirrhoisis. She denies any associated symptoms at this time.   Patient currently denies any jaundice, chills, RUQ pains, dizziness, easy bruising, or fatigue.   Admits continued use of Ursodiol Capsule, 300 MG, as directed 2 am and 1 pm  Of note: Patient was diagnosed with Parkinson's 10/2020 and is being followed by Dr. Hazel for further care.    Last visit (9/01/20) Patient presents today to review labs, Liver Biopsy and follow up of Primary Biliary Cirrhosis. She continue to take Ursodiol 300 mg, 2 QAM and 1 QPM. Patient had a U/S guided liver biopsy completed on 8/21/2020 with results listed below.   She continues to admits constant feeling of fatigue.  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  She will take Ambien prn with improvement.  Denies itchiness, abdominal pain, jaundice,  nausea, or vomiting.   Last visit (8/4/2020) Patient presents today to review labs and follow up of PBC.  She continue to take Ursodiol 300 MG, 2 am and 1 pm daily  She admit always feeling fatigue.  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  She will take Ambien prn with improvement.   Denies itchiness, abdominal pain, jaundice,  nausea, or vomiting.  She continue to take admit the continued use Ursodiol Capsule, 300 MG, 2 am and 1 pm.      Last visit (2/4/2020) Patient presents today to review labs and follow up of Primary Biliary Cirrhosis. She  continue to take Ursodiol 300 mg 2 QAM and 1 QPM.   She admit increase fatigue, described as feeling sleepy and more tired then   Denies itchiness, abdominal pain, jaundice,  nausea, or vomiting.   Had been on antibiotics in December 2109 for 7 days for UTI   07/16/2019 Patient presents today for a follow up of Primary Biliary Cirrhosis and to review recent labs.  Patient was diagnosed with PBC in 2010.   She began treatment with Ocaliva (4/2019 -39 days ago)  and continue to take Ursodiol 300 mg 2 QAM and 1 QPM.   She admit increase fatigue, described as feeling sleepy and more tired then usual since last visit.  She has been trying to stay busy to avoid disrupting her sleep pattern.  Denies itchiness, abdominal pain, jaundice,  nausea, or vomiting.   Last visit  (2/26/2019) Patient presents today for a follow up of Primary Biliary Cirrhosis and to review recent labs.  Patient was diagnosed with PBC in 2010.   She continue to take Ursodiol 300 mg BID.    She currently admit 1 bowel movement per day with normal and formed stools.  Denies melena, blood or mucus in stools.   She denies jaundice, RUQ abdominal pain, nausea, vomiting, or fatigue.    Last visit (11/27/2018) Patient presents today for a follow up of Primary Biliary Cirrhosis and to review recent labs. Patient was diagnosed with PBC in 2010. She admits to continuing Ursodiol since last visit. She denies nausea, vomiting, or fatigue at this time. Patient admits to intermittent epigastric pain.  05/29/2018 Patient presents today for a follow up of Primary Biliary Cirrhosis and to review recent lab results. She has a history of PBC diagnosed in 2010 via labs and a liver biopsy.   She continues to experience fatigue daily. She denies any recent NSAID's, RUQ pain, jaundice, dizziness or chills. Continues to easy bruising.  Patient did submit a stool specimen to the lab, however they had given her the wrong specimen container and the test could not be competed.  11/28/2017 Patient presents today to review labs and for a follow up of Primary Biliary Cirrhosis. She was diagnosed with PBC in 2010 following routine labs and a liver biopsy to confirm the diagnosis. She notes improvement to fatigue since the last visit but states her energy levels have been low since her total hysterectomy in October 2017. Patient notes she has continued to abstain from NSAID's since last visit.   Patient currently denies RUQ pain, jaundice, or dizziness. Denies chills. Admits easy bruising for the past few months. ;   Bowel Obstruction : Patient currently admits 1-2 bowel movements per day without strain.  Stools are  normal and formed . She denies any blood, mucus, melena, pruritus ani, or rectal pain.    Last visit (9/01/20) Patient currently admit 1 bowel movement every other day with occasional straining. Stools are semi-formed.  Denies melena, blood or mucus.   She not take MiraLax Packet, 17 GM, 1 packet mixed with 8 ounces of fluid, Orally, Once a day, 30 day(s), 30    Last visit (8/4/2020) Currently 1 incomplete and strenuous bowel movement ever other day over the month. Denies melena, blood or mucus in stools.  She has been increasing her water intake.    Last visit (2/4/2020) Patient admits that she will have 1 bowel movement every other day. She admits strain with every movement she denies any rectal bleeding at this time. She denies any mucus at this time.     Last visit (2/4/2020) Currently admit 2 strenuous bowel movements per day, then will skip 2 days.  Stools vary from soft semi-formed to loose.  Denies melena, blood or mucus in stools.   She has not taken medication for constipation.   Last visit (07/16/2019) Currently admit 1 bowel movement per day.  Stools are soft and formed.  Denies melena, blood or mucus in stools.   Last visit (2/26/2019 Patient admits she began experiencing severe abdominal pain in August 2018. She admits having a CT scan that revealed a small bowel obstruction.   Patient admit having a small bowel resection completed in August 2018 by Dr Aime Villegas at [facility].   Last visit (11/27/2018) Patient admits she began experiencing severe abdominal pain in August. She admits having a CT scan that revealed a small bowel obstruction. Patient admits having a small bowel resection completed in August by Dr Aime Villegas at [facility]. ;   Epigastric Pain : Patient denies any recent episodes of epigastric pain.  She has sharp pain intermittent , every few weeks    Last visit (9/01/20) Patient continue to admit episodes of epigastric pain that occur without provocation.  Symptoms will last seconds to minutes at a time then dissipate on it's own.. She describes symptoms as a dull pain.     Last visit (8/4/2020) Patient continue to admit episodes of epigastric pain since her last office visit that lasts seconds to minutes. She describes symptoms as a dull pain.   Last visit (2/4/2020) Patient admits epigastric pain since her last office visit that lasts seconds to minutes. She describes symptoms as a dull pain.   She denies any bloating and gas at this time.    Last visit (2/4/2020) She continue to deny epigastric pain.   07/16/2019 She continue to deny epigastric pain.   2/26/2019 Denies epigastric pain.  Last visit (11/27/2018) Patient complains of intermittent epigastric pain . She admits she had been experiencing this pain since before August. Patient admits pain subsides after she eats. Patient denies taking any OTC antacids at this time. She denies nausea or vomiting.;

## 2021-02-16 ENCOUNTER — TELEPHONE ENCOUNTER (OUTPATIENT)
Dept: URBAN - METROPOLITAN AREA CLINIC 35 | Facility: CLINIC | Age: 56
End: 2021-02-16

## 2021-03-29 ENCOUNTER — TELEPHONE ENCOUNTER (OUTPATIENT)
Dept: URBAN - METROPOLITAN AREA CLINIC 35 | Facility: CLINIC | Age: 56
End: 2021-03-29

## 2021-03-29 PROBLEM — 31712002 PRIMARY BILIARY CHOLANGITIS: Status: ACTIVE | Noted: 2021-03-29

## 2021-04-03 LAB
ALBUMIN, SERUM: 4.1
ALKALINE PHOSPHATASE, S: 230
ALT (SGPT): 27
AST (SGOT): 36
BILIRUBIN, DIRECT: 0.07
BILIRUBIN, TOTAL: <0.2
PROTEIN, TOTAL, SERUM: 7.6

## 2021-05-19 ENCOUNTER — TELEPHONE ENCOUNTER (OUTPATIENT)
Dept: URBAN - METROPOLITAN AREA CLINIC 35 | Facility: CLINIC | Age: 56
End: 2021-05-19

## 2021-05-19 RX ORDER — URSODIOL 300 MG/1
AS DIRECTED CAPSULE ORAL TWICE A DAY
Qty: 270 | Refills: 1 | OUTPATIENT

## 2021-05-24 ENCOUNTER — TELEPHONE ENCOUNTER (OUTPATIENT)
Dept: URBAN - METROPOLITAN AREA CLINIC 35 | Facility: CLINIC | Age: 56
End: 2021-05-24

## 2021-07-06 ENCOUNTER — LAB OUTSIDE AN ENCOUNTER (OUTPATIENT)
Dept: URBAN - METROPOLITAN AREA CLINIC 35 | Facility: CLINIC | Age: 56
End: 2021-07-06

## 2021-07-27 ENCOUNTER — OFFICE VISIT (OUTPATIENT)
Dept: URBAN - METROPOLITAN AREA CLINIC 35 | Facility: CLINIC | Age: 56
End: 2021-07-27

## 2021-08-24 ENCOUNTER — TELEPHONE ENCOUNTER (OUTPATIENT)
Dept: URBAN - METROPOLITAN AREA CLINIC 35 | Facility: CLINIC | Age: 56
End: 2021-08-24

## 2021-08-31 ENCOUNTER — TELEPHONE ENCOUNTER (OUTPATIENT)
Dept: URBAN - METROPOLITAN AREA CLINIC 35 | Facility: CLINIC | Age: 56
End: 2021-08-31

## 2021-08-31 ENCOUNTER — DASHBOARD ENCOUNTERS (OUTPATIENT)
Age: 56
End: 2021-08-31

## 2021-08-31 ENCOUNTER — OFFICE VISIT (OUTPATIENT)
Dept: URBAN - METROPOLITAN AREA CLINIC 35 | Facility: CLINIC | Age: 56
End: 2021-08-31

## 2021-08-31 VITALS
HEART RATE: 97 BPM | SYSTOLIC BLOOD PRESSURE: 138 MMHG | WEIGHT: 119 LBS | BODY MASS INDEX: 20.32 KG/M2 | OXYGEN SATURATION: 98 % | DIASTOLIC BLOOD PRESSURE: 76 MMHG | HEIGHT: 64 IN

## 2021-08-31 PROBLEM — 31712002 PRIMARY BILIARY CIRRHOSIS: Status: ACTIVE | Noted: 2020-12-08

## 2021-08-31 RX ORDER — DIPHENHYDRAMINE HCL 2 %
1 TABLET AS NEEDED CREAM (GRAM) TOPICAL
Status: ACTIVE | COMMUNITY

## 2021-08-31 RX ORDER — LORATADINE 10 MG/1
1 TABLET TABLET ORAL ONCE A DAY
Qty: 30 | Status: ON HOLD | COMMUNITY

## 2021-08-31 RX ORDER — HYOSCYAMINE SULFATE 0.12 MG/1
1 TABLET AS NEEDED TABLET ORAL
Qty: 60 | Refills: 0 | Status: ON HOLD | COMMUNITY
Start: 2021-01-26

## 2021-08-31 RX ORDER — LORATADINE 10 MG/1
1 TABLET TABLET ORAL ONCE A DAY
Qty: 30 | Status: DISCONTINUED | COMMUNITY

## 2021-08-31 RX ORDER — ZOLPIDEM TARTRATE 10 MG/1
1 TABLET AT BEDTIME AS NEEDED TABLET, FILM COATED ORAL ONCE A DAY
Status: ACTIVE | COMMUNITY

## 2021-08-31 RX ORDER — BECLOMETHASONE DIPROPIONATE HFA 40 UG/1
1 PUFF AEROSOL, METERED RESPIRATORY (INHALATION) ONCE A DAY
Status: ACTIVE | COMMUNITY

## 2021-08-31 RX ORDER — URSODIOL 300 MG/1
AS DIRECTED CAPSULE ORAL TWICE A DAY
Qty: 270 | Refills: 1 | Status: ACTIVE | COMMUNITY

## 2021-08-31 RX ORDER — URSODIOL 300 MG/1
AS DIRECTED CAPSULE ORAL TWICE A DAY
OUTPATIENT

## 2021-08-31 RX ORDER — ATORVASTATIN CALCIUM, FILM COATED 20 MG/1
TAKE 1 TABLET BY MOUTH EVERYDAY AT BEDTIME TABLET ORAL
Qty: 90 UNSPECIFIED | Refills: 1 | Status: DISCONTINUED | COMMUNITY

## 2021-08-31 RX ORDER — ALBUTEROL SULFATE 90 UG/1
1 PUFF AS NEEDED AEROSOL, METERED RESPIRATORY (INHALATION)
Status: ACTIVE | COMMUNITY

## 2021-08-31 RX ORDER — SALICYLIC ACID 3 G/100G
1 TABLET AS NEEDED LOTION TOPICAL ONCE A DAY
Qty: 30 | Status: ACTIVE | COMMUNITY

## 2021-08-31 RX ORDER — EZETIMIBE 10 MG/1
TABLET ORAL
Qty: 90 | Status: ACTIVE | COMMUNITY

## 2021-08-31 NOTE — HPI-MIGRATED HPI
;   ;   ;     Primary Biliary Cirrhosis (PBC) : Patient presents today to review labs and follow up of Primary Biliary Cirrhosis. She continue to deny any associated symptoms at this time.   Currently denies any jaundice, chills, RUQ pains, dizziness, easy bruising, or fatigue.   Admits continued use of Ursodiol Capsule, 300 MG, as directed 2 am and 1 pm.   Of note, patient report resolution of epigastric pain after her Cardiologist Dr. Laws changed Atorvastatin to Ezetimibe 10 MG.     Last visit (1/26/2020) Patient presents today for a follow up of Primary Biliary Cirrhosis. She denies any associated symptoms at this time.   Patient currently denies any jaundice, chills, RUQ pains, dizziness, easy bruising, or fatigue.   Admits continued use of Ursodiol Capsule, 300 MG, as directed 2 am and 1 pm  Of note: Patient was diagnosed with Parkinson's 10/2020 and is being followed by Dr. Hazel for further care.    Last visit (9/01/20) Patient presents today to review labs, Liver Biopsy and follow up of Primary Biliary Cirrhosis. She continue to take Ursodiol 300 mg, 2 QAM and 1 QPM. Patient had a U/S guided liver biopsy completed on 8/21/2020 with results listed below.   She continues to admits constant feeling of fatigue.  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  She will take Ambien prn with improvement.  Denies itchiness, abdominal pain, jaundice,  nausea, or vomiting.   Last visit (8/4/2020) Patient presents today to review labs and follow up of PBC.  She continue to take Ursodiol 300 MG, 2 am and 1 pm daily  She admit always feeling fatigue.  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  She will take Ambien prn with improvement.   Denies itchiness, abdominal pain, jaundice,  nausea, or vomiting.  She continue to take admit the continued use Ursodiol Capsule, 300 MG, 2 am and 1 pm.      Last visit (2/4/2020) Patient presents today to review labs and follow up of Primary Biliary Cirrhosis. She  continue to take Ursodiol 300 mg 2 QAM and 1 QPM.   She admit increase fatigue, described as feeling sleepy and more tired then   Denies itchiness, abdominal pain, jaundice,  nausea, or vomiting.   Had been on antibiotics in December 2109 for 7 days for UTI   07/16/2019 Patient presents today for a follow up of Primary Biliary Cirrhosis and to review recent labs.  Patient was diagnosed with PBC in 2010.   She began treatment with Ocaliva (4/2019 -39 days ago)  and continue to take Ursodiol 300 mg 2 QAM and 1 QPM.   She admit increase fatigue, described as feeling sleepy and more tired then usual since last visit.  She has been trying to stay busy to avoid disrupting her sleep pattern.  Denies itchiness, abdominal pain, jaundice,  nausea, or vomiting.   Last visit  (2/26/2019) Patient presents today for a follow up of Primary Biliary Cirrhosis and to review recent labs.  Patient was diagnosed with PBC in 2010.   She continue to take Ursodiol 300 mg BID.    She currently admit 1 bowel movement per day with normal and formed stools.  Denies melena, blood or mucus in stools.   She denies jaundice, RUQ abdominal pain, nausea, vomiting, or fatigue.    Last visit (11/27/2018) Patient presents today for a follow up of Primary Biliary Cirrhosis and to review recent labs. Patient was diagnosed with PBC in 2010. She admits to continuing Ursodiol since last visit. She denies nausea, vomiting, or fatigue at this time. Patient admits to intermittent epigastric pain.  05/29/2018 Patient presents today for a follow up of Primary Biliary Cirrhosis and to review recent lab results. She has a history of PBC diagnosed in 2010 via labs and a liver biopsy.   She continues to experience fatigue daily. She denies any recent NSAID's, RUQ pain, jaundice, dizziness or chills. Continues to easy bruising.  Patient did submit a stool specimen to the lab, however they had given her the wrong specimen container and the test could not be competed.  11/28/2017 Patient presents today to review labs and for a follow up of Primary Biliary Cirrhosis. She was diagnosed with PBC in 2010 following routine labs and a liver biopsy to confirm the diagnosis. She notes improvement to fatigue since the last visit but states her energy levels have been low since her total hysterectomy in October 2017. Patient notes she has continued to abstain from NSAID's since last visit.   Patient currently denies RUQ pain, jaundice, or dizziness. Denies chills. Admits easy bruising for the past few months.;   Bowel Obstruction : Currrently admits 1-2 bowel movements per day without strain.  Stools are  normal and formed . She denies any blood, mucus, melena, pruritus ani, or rectal pain.     Last visit (1/26/2020) Patient currently admits 1-2 bowel movements per day without strain.  Stools are  normal and formed . She denies any blood, mucus, melena, pruritus ani, or rectal pain.    Last visit (9/01/20) Patient currently admit 1 bowel movement every other day with occasional straining. Stools are semi-formed.  Denies melena, blood or mucus.   She not take MiraLax Packet, 17 GM, 1 packet mixed with 8 ounces of fluid, Orally, Once a day, 30 day(s), 30    Last visit (8/4/2020) Currently 1 incomplete and strenuous bowel movement ever other day over the month. Denies melena, blood or mucus in stools.  She has been increasing her water intake.    Last visit (2/4/2020) Patient admits that she will have 1 bowel movement every other day. She admits strain with every movement she denies any rectal bleeding at this time. She denies any mucus at this time.     Last visit (2/4/2020) Currently admit 2 strenuous bowel movements per day, then will skip 2 days.  Stools vary from soft semi-formed to loose.  Denies melena, blood or mucus in stools.   She has not taken medication for constipation.   Last visit (07/16/2019) Currently admit 1 bowel movement per day.  Stools are soft and formed.  Denies melena, blood or mucus in stools.   Last visit (2/26/2019 Patient admits she began experiencing severe abdominal pain in August 2018. She admits having a CT scan that revealed a small bowel obstruction.   Patient admit having a small bowel resection completed in August 2018 by Dr Aime Villegas at [facility].   Last visit (11/27/2018) Patient admits she began experiencing severe abdominal pain in August. She admits having a CT scan that revealed a small bowel obstruction. Patient admits having a small bowel resection completed in August by Dr Aime Villegas at [facility].;   Epigastric Pain : She report resolution of symptoms after her Cardiologist Dr. Laws changed Atorvastatin to Ezetimibe 10 MG.  She did not have to take Hyoscyamine 0.125 MG.   Last visit (1/26/2020) Patient denies any recent episodes of epigastric pain.  She has sharp pain intermittent , every few weeks    Last visit (9/01/20) Patient continue to admit episodes of epigastric pain that occur without provocation.  Symptoms will last seconds to minutes at a time then dissipate on it's own.. She describes symptoms as a dull pain.     Last visit (8/4/2020) Patient continue to admit episodes of epigastric pain since her last office visit that lasts seconds to minutes. She describes symptoms as a dull pain.   Last visit (2/4/2020) Patient admits epigastric pain since her last office visit that lasts seconds to minutes. She describes symptoms as a dull pain.   She denies any bloating and gas at this time.    Last visit (2/4/2020) She continue to deny epigastric pain.   07/16/2019 She continue to deny epigastric pain.   2/26/2019 Denies epigastric pain.  Last visit (11/27/2018) Patient complains of intermittent epigastric pain . She admits she had been experiencing this pain since before August. Patient admits pain subsides after she eats. Patient denies taking any OTC antacids at this time. She denies nausea or vomiting.;

## 2021-09-01 LAB
A/G RATIO: 1.3
ALBUMIN, SERUM: 4.3
ALKALINE PHOSPHATASE, S: 225
ALT (SGPT): 28
AST (SGOT): 31
BILIRUBIN, TOTAL: 0.2
BUN/CREATININE RATIO: 19
BUN: 15
CALCIUM, SERUM: 9.4
CARBON DIOXIDE, TOTAL: 25
CHLORIDE, SERUM: 106
CREATININE, SERUM: 0.77
EGFR IF AFRICN AM: 100
EGFR IF NONAFRICN AM: 87
GLOBULIN, TOTAL: 3.3
GLUCOSE, SERUM: 109
POTASSIUM, SERUM: 4.2
PROTEIN, TOTAL, SERUM: 7.6
SODIUM, SERUM: 143
VITAMIN A: 47.9
VITAMIN D, 25-HYDROXY: 43.4
VITAMIN E(ALPHA TOCOPHEROL): 12.4
VITAMIN E(GAMMA TOCOPHEROL): 0.6
VITAMIN K1: 2.12

## 2021-10-07 ENCOUNTER — TELEPHONE ENCOUNTER (OUTPATIENT)
Dept: URBAN - METROPOLITAN AREA CLINIC 35 | Facility: CLINIC | Age: 56
End: 2021-10-07

## 2021-10-07 RX ORDER — URSODIOL 300 MG/1
2 QAM AND 1 QPM CAPSULE ORAL TWICE A DAY
Qty: 270 | Refills: 1 | OUTPATIENT

## 2021-12-06 ENCOUNTER — TELEPHONE ENCOUNTER (OUTPATIENT)
Dept: URBAN - METROPOLITAN AREA CLINIC 36 | Facility: CLINIC | Age: 56
End: 2021-12-06

## 2022-02-02 ENCOUNTER — LAB OUTSIDE AN ENCOUNTER (OUTPATIENT)
Dept: URBAN - METROPOLITAN AREA CLINIC 35 | Facility: CLINIC | Age: 57
End: 2022-02-02

## 2022-02-07 ENCOUNTER — LAB OUTSIDE AN ENCOUNTER (OUTPATIENT)
Dept: URBAN - METROPOLITAN AREA CLINIC 35 | Facility: CLINIC | Age: 57
End: 2022-02-07

## 2022-02-21 ENCOUNTER — TELEPHONE ENCOUNTER (OUTPATIENT)
Dept: URBAN - METROPOLITAN AREA CLINIC 36 | Facility: CLINIC | Age: 57
End: 2022-02-21

## 2022-02-22 ENCOUNTER — OFFICE VISIT (OUTPATIENT)
Dept: URBAN - METROPOLITAN AREA CLINIC 35 | Facility: CLINIC | Age: 57
End: 2022-02-22